# Patient Record
Sex: FEMALE | Race: WHITE | NOT HISPANIC OR LATINO | Employment: OTHER | URBAN - METROPOLITAN AREA
[De-identification: names, ages, dates, MRNs, and addresses within clinical notes are randomized per-mention and may not be internally consistent; named-entity substitution may affect disease eponyms.]

---

## 2017-01-12 ENCOUNTER — TELEPHONE (OUTPATIENT)
Dept: VASCULAR SURGERY | Facility: CLINIC | Age: 77
End: 2017-01-12

## 2017-01-12 NOTE — TELEPHONE ENCOUNTER
Pt reports having no significant lower extremity pain at present time and would prefer to postpone appt due to transportation issues. Will have Dr. Brown review study to determine necessity of appointment.

## 2017-01-31 ENCOUNTER — TELEPHONE (OUTPATIENT)
Dept: VASCULAR SURGERY | Facility: CLINIC | Age: 77
End: 2017-01-31

## 2017-01-31 NOTE — TELEPHONE ENCOUNTER
Informed pt that Dr. Brown reviewed the CT results and that no appt was necessary at this time if she was not having any sx of claudication in her legs. States understanding.